# Patient Record
Sex: MALE | Race: WHITE
[De-identification: names, ages, dates, MRNs, and addresses within clinical notes are randomized per-mention and may not be internally consistent; named-entity substitution may affect disease eponyms.]

---
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
## 2025-01-08 ENCOUNTER — HOSPITAL ENCOUNTER (OUTPATIENT)
Dept: HOSPITAL 53 - M PLAIMG | Age: 20
End: 2025-01-08
Attending: UROLOGY
Payer: COMMERCIAL

## 2025-01-08 DIAGNOSIS — N20.0: Primary | ICD-10-CM

## 2025-01-16 ENCOUNTER — HOSPITAL ENCOUNTER (OUTPATIENT)
Dept: HOSPITAL 53 - M SMT | Age: 20
End: 2025-01-16
Attending: UROLOGY
Payer: COMMERCIAL

## 2025-01-16 DIAGNOSIS — N20.0: Primary | ICD-10-CM

## 2025-07-02 ENCOUNTER — HOSPITAL ENCOUNTER (OUTPATIENT)
Dept: HOSPITAL 53 - M LAB REF | Age: 20
End: 2025-07-02
Attending: PEDIATRICS
Payer: COMMERCIAL

## 2025-07-02 DIAGNOSIS — N20.0: Primary | ICD-10-CM

## 2025-07-03 LAB
AMORPH SED URNS QL MICRO: (no result)
AMORPH URATE CRY URNS QL MICRO: (no result)
APPEARANCE UR: (no result)
BACTERIA UR QL AUTO: NEGATIVE
BILIRUB UR QL STRIP.AUTO: NEGATIVE
CAOX CRY URNS QL MICRO: (no result)
COLOR UR AUTO: YELLOW
GLUCOSE UR QL STRIP.AUTO: NEGATIVE MG/DL
GRAN CASTS URNS QL MICRO: (no result) /LPF
HGB UR QL STRIP.AUTO: (no result)
KETONES UR QL STRIP.AUTO: NEGATIVE MG/DL
LEUKOCYTE ESTERASE UR QL STRIP.AUTO: NEGATIVE
MUCOUS THREADS URNS QL MICRO: (no result)
NITRITE UR QL STRIP.AUTO: NEGATIVE
PH UR STRIP.AUTO: 6 UNITS (ref 5–9)
PROT UR QL STRIP.AUTO: NEGATIVE MG/DL
RBC # UR AUTO: 5 /HPF (ref 0–3)
RENAL EPI CELLS #/AREA URNS HPF: (no result) /HPF
SP GR UR STRIP.AUTO: 1.02 (ref 1–1.03)
SQUAMOUS #/AREA URNS AUTO: 0 /HPF (ref 0–6)
TRANS CELLS #/AREA URNS HPF: (no result) /HPF
TRI-PHOS CRY URNS QL MICRO: (no result)
UROBILINOGEN UR QL STRIP.AUTO: 0.2 MG/DL (ref 0–2)
WAXY CASTS #/AREA UR COMP ASSIST: (no result) /LPF
WBC #/AREA URNS AUTO: (no result) /HPF (ref ?–1)
WBC #/AREA URNS AUTO: 0 /HPF (ref 0–3)
YEAST UR QL AUTO: (no result)